# Patient Record
Sex: MALE | Race: WHITE | NOT HISPANIC OR LATINO | URBAN - METROPOLITAN AREA
[De-identification: names, ages, dates, MRNs, and addresses within clinical notes are randomized per-mention and may not be internally consistent; named-entity substitution may affect disease eponyms.]

---

## 2017-03-14 ENCOUNTER — HOSPITAL ENCOUNTER (OUTPATIENT)
Facility: MEDICAL CENTER | Age: 57
End: 2017-03-14
Attending: FAMILY MEDICINE
Payer: COMMERCIAL

## 2017-03-14 ENCOUNTER — OFFICE VISIT (OUTPATIENT)
Dept: INTERNAL MEDICINE | Facility: IMAGING CENTER | Age: 57
End: 2017-03-14
Payer: COMMERCIAL

## 2017-03-14 VITALS
RESPIRATION RATE: 12 BRPM | SYSTOLIC BLOOD PRESSURE: 130 MMHG | DIASTOLIC BLOOD PRESSURE: 82 MMHG | HEIGHT: 74 IN | TEMPERATURE: 97.7 F | WEIGHT: 208 LBS | OXYGEN SATURATION: 95 % | BODY MASS INDEX: 26.69 KG/M2 | HEART RATE: 86 BPM

## 2017-03-14 DIAGNOSIS — I10 ESSENTIAL HYPERTENSION: ICD-10-CM

## 2017-03-14 DIAGNOSIS — E78.5 DYSLIPIDEMIA: ICD-10-CM

## 2017-03-14 DIAGNOSIS — H83.3X3 NOISE-INDUCED HEARING LOSS OF BOTH EARS: Chronic | ICD-10-CM

## 2017-03-14 DIAGNOSIS — G89.29 OTHER CHRONIC BACK PAIN: ICD-10-CM

## 2017-03-14 DIAGNOSIS — E29.1 HYPOGONADISM MALE: ICD-10-CM

## 2017-03-14 DIAGNOSIS — M54.9 OTHER CHRONIC BACK PAIN: ICD-10-CM

## 2017-03-14 DIAGNOSIS — F41.8 SITUATIONAL ANXIETY: Chronic | ICD-10-CM

## 2017-03-14 DIAGNOSIS — Z23 NEED FOR TDAP VACCINATION: ICD-10-CM

## 2017-03-14 LAB
ALBUMIN SERPL BCP-MCNC: 4.6 G/DL (ref 3.2–4.9)
ALBUMIN/GLOB SERPL: 2.1 G/DL
ALP SERPL-CCNC: 48 U/L (ref 30–99)
ALT SERPL-CCNC: 30 U/L (ref 2–50)
ANION GAP SERPL CALC-SCNC: 6 MMOL/L (ref 0–11.9)
AST SERPL-CCNC: 25 U/L (ref 12–45)
BASOPHILS # BLD AUTO: 0.04 K/UL (ref 0–0.12)
BASOPHILS NFR BLD AUTO: 0.8 % (ref 0–1.8)
BILIRUB SERPL-MCNC: 0.5 MG/DL (ref 0.1–1.5)
BUN SERPL-MCNC: 17 MG/DL (ref 8–22)
CALCIUM SERPL-MCNC: 9.4 MG/DL (ref 8.5–10.5)
CHLORIDE SERPL-SCNC: 104 MMOL/L (ref 96–112)
CHOLEST SERPL-MCNC: 262 MG/DL (ref 100–199)
CO2 SERPL-SCNC: 28 MMOL/L (ref 20–33)
CREAT SERPL-MCNC: 0.91 MG/DL (ref 0.5–1.4)
EOSINOPHIL # BLD: 0.03 K/UL (ref 0–0.51)
EOSINOPHIL NFR BLD AUTO: 0.6 % (ref 0–6.9)
ERYTHROCYTE [DISTWIDTH] IN BLOOD BY AUTOMATED COUNT: 45.8 FL (ref 35.9–50)
EST. AVERAGE GLUCOSE BLD GHB EST-MCNC: 123 MG/DL
FOLATE SERPL-MCNC: >23.6 NG/ML
GLOBULIN SER CALC-MCNC: 2.2 G/DL (ref 1.9–3.5)
GLUCOSE SERPL-MCNC: 107 MG/DL (ref 65–99)
HBA1C MFR BLD: 5.9 % (ref 0–5.6)
HCT VFR BLD AUTO: 48.7 % (ref 42–52)
HDLC SERPL-MCNC: 50 MG/DL
HGB BLD-MCNC: 15.6 G/DL (ref 14–18)
IMM GRANULOCYTES # BLD AUTO: 0.02 K/UL (ref 0–0.11)
IMM GRANULOCYTES NFR BLD AUTO: 0.4 % (ref 0–0.9)
LDLC SERPL CALC-MCNC: 177 MG/DL
LYMPHOCYTES # BLD: 1.55 K/UL (ref 1–4.8)
LYMPHOCYTES NFR BLD AUTO: 31.7 % (ref 22–41)
MAGNESIUM SERPL-MCNC: 2 MG/DL (ref 1.5–2.5)
MCH RBC QN AUTO: 30.2 PG (ref 27–33)
MCHC RBC AUTO-ENTMCNC: 32 G/DL (ref 33.7–35.3)
MCV RBC AUTO: 94.4 FL (ref 81.4–97.8)
MONOCYTES # BLD: 0.34 K/UL (ref 0–0.85)
MONOCYTES NFR BLD AUTO: 7 % (ref 0–13.4)
NEUTROPHILS # BLD: 2.91 K/UL (ref 1.82–7.42)
NEUTROPHILS NFR BLD AUTO: 59.5 % (ref 44–72)
NRBC # BLD AUTO: 0 K/UL
NRBC BLD-RTO: 0 /100 WBC
PHOSPHATE SERPL-MCNC: 3.4 MG/DL (ref 2.5–4.5)
PLATELET # BLD AUTO: 196 K/UL (ref 164–446)
PMV BLD AUTO: 11.3 FL (ref 9–12.9)
POTASSIUM SERPL-SCNC: 4 MMOL/L (ref 3.6–5.5)
PROT SERPL-MCNC: 6.8 G/DL (ref 6–8.2)
PSA SERPL DL<=0.01 NG/ML-MCNC: 1.53 NG/ML (ref 0–4)
RBC # BLD AUTO: 5.16 M/UL (ref 4.7–6.1)
SODIUM SERPL-SCNC: 138 MMOL/L (ref 135–145)
TESTOST SERPL-MCNC: 290 NG/DL (ref 175–781)
TRIGL SERPL-MCNC: 175 MG/DL (ref 0–149)
TSH SERPL DL<=0.005 MIU/L-ACNC: 1.28 UIU/ML (ref 0.3–3.7)
VIT B12 SERPL-MCNC: 403 PG/ML (ref 211–911)
WBC # BLD AUTO: 4.9 K/UL (ref 4.8–10.8)

## 2017-03-14 PROCEDURE — 84403 ASSAY OF TOTAL TESTOSTERONE: CPT

## 2017-03-14 PROCEDURE — 80053 COMPREHEN METABOLIC PANEL: CPT

## 2017-03-14 PROCEDURE — 84443 ASSAY THYROID STIM HORMONE: CPT

## 2017-03-14 PROCEDURE — 84153 ASSAY OF PSA TOTAL: CPT

## 2017-03-14 PROCEDURE — 82607 VITAMIN B-12: CPT

## 2017-03-14 PROCEDURE — 90715 TDAP VACCINE 7 YRS/> IM: CPT | Performed by: FAMILY MEDICINE

## 2017-03-14 PROCEDURE — 80061 LIPID PANEL: CPT

## 2017-03-14 PROCEDURE — 82746 ASSAY OF FOLIC ACID SERUM: CPT

## 2017-03-14 PROCEDURE — 85025 COMPLETE CBC W/AUTO DIFF WBC: CPT

## 2017-03-14 PROCEDURE — 99204 OFFICE O/P NEW MOD 45 MIN: CPT | Mod: 25 | Performed by: FAMILY MEDICINE

## 2017-03-14 PROCEDURE — 84100 ASSAY OF PHOSPHORUS: CPT

## 2017-03-14 PROCEDURE — 83036 HEMOGLOBIN GLYCOSYLATED A1C: CPT

## 2017-03-14 PROCEDURE — 83735 ASSAY OF MAGNESIUM: CPT

## 2017-03-14 PROCEDURE — 90471 IMMUNIZATION ADMIN: CPT | Performed by: FAMILY MEDICINE

## 2017-03-14 RX ORDER — ASCORBIC ACID 500 MG
500 TABLET ORAL DAILY
COMMUNITY
End: 2023-12-13

## 2017-03-14 RX ORDER — CALCIUM CARBONATE 500(1250)
500 TABLET ORAL 2 TIMES DAILY WITH MEALS
COMMUNITY
End: 2021-02-19

## 2017-03-14 NOTE — MR AVS SNAPSHOT
"        Reggie Correia   3/14/2017 10:00 AM   Office Visit   MRN: [id number]    Department:  [facility]   Dept Phone:  [phone]    Description:  Male : 1960   Provider:  Deyanira Robert M.D.           Allergies as of 3/14/2017     No Known Allergies      You were diagnosed with     Hypogonadism male   [783500]       Essential hypertension   [5897131]       Dyslipidemia   [130034]       Other chronic back pain   [8853399]       Need for Tdap vaccination   [113200]       Situational anxiety   [240117]       Noise-induced hearing loss of both ears   [6117737]         Vital Signs     Blood Pressure Pulse Temperature Respirations Height Weight    130/82 mmHg 86 36.5 °C (97.7 °F) 12 1.88 m (6' 2\") 94.348 kg (208 lb)    Body Mass Index Oxygen Saturation Smoking Status             26.69 kg/m2 95% Never Smoker          Basic Information     Date Of Birth Sex Race Ethnicity Preferred Language    1960 Male White Non- English      Problem List              ICD-10-CM Priority Class Noted - Resolved    Dyslipidemia E78.5   2/3/2011 - Present    Chronic back pain M54.9, G89.29   2/3/2011 - Present    Hypogonadism male E29.1   2012 - Present    HTN (hypertension) I10   2012 - Present    Situational anxiety (Chronic) F41.8   3/14/2017 - Present    Noise-induced hearing loss of both ears (Chronic) H83.3X3   3/14/2017 - Present      Health Maintenance        Date Due Completion Dates    IMM DTaP/Tdap/Td Vaccine (1 - Tdap) 1979 ---    IMM INFLUENZA (1) 10/2/2017 (Originally 2016) ---    COLONOSCOPY 2021 (Done)    Override on 2011: Done            Current Immunizations     Tdap Vaccine 3/14/2017      Below and/or attached are the medications your provider expects you to take. Review all of your home medications and newly ordered medications with your provider and/or pharmacist. Follow medication instructions as directed by your provider and/or pharmacist. " Please keep your medication list with you and share with your provider. Update the information when medications are discontinued, doses are changed, or new medications (including over-the-counter products) are added; and carry medication information at all times in the event of emergency situations     Allergies:  No Known Allergies          Medications  Valid as of: March 14, 2017 -  1:40 PM    Generic Name Brand Name Tablet Size Instructions for use    ALPRAZolam (Tab) XANAX 0.5 MG Take 1 Tab by mouth. Patient to take 1 tab PO q 8 hours prn anxiety or sleep        Ascorbic Acid (Tab) ascorbic acid 500 MG Take 500 mg by mouth every day.        Azelastine HCl (Solution) OPTIVAR 0.05 % Place 1 Drop in right eye 2 times a day. Use in affected eye(s)        Multiple Vitamin (Tab) THERAGRAN  Take 1 Tab by mouth every day.        Oyster Shell (Tab) OS-KALEB 500 500 MG Take 500 mg by mouth 2 times a day, with meals.        .                 Medicines prescribed today were sent to:     [id number] - [place], [place] - [address]    [address] [place] [address]    Phone: [phone] Fax: [phone]    Open 24 Hours?: No      Medication refill instructions:       If your prescription bottle indicates you have medication refills left, it is not necessary to call your provider’s office. Please contact your pharmacy and they will refill your medication.    If your prescription bottle indicates you do not have any refills left, you may request refills at any time through one of the following ways: The online [employer] system (except Urgent Care), by calling your provider’s office, or by asking your pharmacy to contact your provider’s office with a refill request. Medication refills are processed only during regular business hours and may not be available until the next business day. Your provider may request additional information or to have a follow-up visit with you prior to refilling your medication.   *Please  Note: Medication refills are assigned a new Rx number when refilled electronically. Your pharmacy may indicate that no refills were authorized even though a new prescription for the same medication is available at the pharmacy. Please request the medicine by name with the pharmacy before contacting your provider for a refill.        Your To Do List     Future Labs/Procedures Complete By Expires    CBC WITH DIFFERENTIAL  As directed 3/14/2018    COMP METABOLIC PANEL  As directed 3/14/2018    FOLATE  As directed 3/14/2018    HEMOGLOBIN A1C  As directed 3/14/2018    LIPID PROFILE  As directed 3/14/2018    MAGNESIUM  As directed 3/14/2018    PHOSPHORUS  As directed 3/14/2018    PROSTATE SPECIFIC AG SCREENING  As directed 3/14/2018    TESTOSTERONE SERUM  As directed 3/14/2018    TSH WITH REFLEX TO FT4  As directed 3/14/2018    VITAMIN B12  As directed 3/14/2018         MyChart Access Code: Activation code not generated  Current MyChart Status: Active          Quit Tobacco Information     Do you want to quit using tobacco?    Quitting tobacco decreases risks of cancer, heart and lung disease, increases life expectancy, improves sense of taste and smell, and increases spending money, among other benefits.    If you are thinking about quitting, we can help.  • [facility] Quit Tobacco Program: [phone]  o Program occurs weekly for four weeks and includes pharmacist consultation on products to support quitting smoking or chewing tobacco. A provider referral is needed for pharmacist consultation.  • Tobacco Users Help Hotline: 0-474-QUIT-NOW (839-2407) or https://[place].quitlogix.org/  o Free, confidential telephone and online coaching for [place] residents. Sessions are designed on a schedule that is convenient for you. Eligible clients receive free nicotine replacement therapy.  • Nationally: www.smokefree.gov  o Information and professional assistance to support both immediate and long-term needs as you become, and remain, a  non-smoker. Smokefree.gov allows you to choose the help that best fits your needs.

## 2017-03-14 NOTE — PROGRESS NOTES
"CC: Establish care    HPI:  Patient is a 56 y.o. male who last saw PCP back in 2012 for chronic medical issues, and he and his wife are here to establish care today. He reports multiple chronic issues but has truly not had any kind of follow-up on them. He has stopped multiple prescribed medications over the years on his own to due perception of ineffectiveness. He does report having a clean colonoscopy with [facility] in the past and overall feels fairly well. He has not current complaints and would like baseline labs drawn today. He reports prior work-up for hiatal hernia and BPH by GI and Urology - both negative.    Patient Active Problem List    Diagnosis Date Noted   • Situational anxiety 03/14/2017   • Hypogonadism male 01/05/2012   • HTN (hypertension) 01/05/2012   • Dyslipidemia 02/03/2011   • Chronic back pain 02/03/2011     Past medical, surgical, family, and social history was reviewed and updated in Epic chart by me today.     Medications and allergies reviewed and updated in Epic chart by me today.     ROS:  Pertinent positives listed above in HPI. All other systems have been reviewed and are negative.    PE:   /82 mmHg  Pulse 86  Temp(Src) 36.5 °C (97.7 °F)  Resp 12  Ht 1.88 m (6' 2\")  Wt 94.348 kg (208 lb)  BMI 26.69 kg/m2  SpO2 95%  Vital signs reviewed with patient.     Gen: Well developed; well nourished; no acute distress; age appropriate appearance   HEENT: Normocephalic; atraumatic; PEERLA b/l; sclera clear b/l; b/l external auditory canals WNL; b/l TM WNL; oropharynx clear; oral mucosa moist; tongue midline; dentition poor; no oral lesions noted  Neck: No adenopathy; no thyromegaly  CV: Regular rate and rhythm; S1/ S2 present; no murmur, gallop or rub noted  Pulm: No respiratory distress; clear to ascultation b/l; no wheezing or stridor noted b/l  Abd: Adequate bowel sounds noted; soft and nontender; no rebound, rigidity, nor distention  Extremities: No peripheral edema b/l LE " extremities/ no clubbing nor cyanosis noted  Skin: Warm and dry; no rashes noted   Neuro: No focal deficits noted   Psych: AAOx4; mood and affect are appropriate  Rectal deferred due to PSA testing     A/P:  1. Chronic hypogonadism male  Previously on testosterone replacement - stopped due to cost issues/ will recheck level today  - TESTOSTERONE SERUM; Future    2. Chronic essential hypertension  No current treatment per patient report/ will follow and check labs today  - CBC WITH DIFFERENTIAL; Future  - COMP METABOLIC PANEL; Future  - HEMOGLOBIN A1C; Future  - PROSTATE SPECIFIC AG SCREENING; Future  - VITAMIN B12; Future  - FOLATE; Future  - MAGNESIUM; Future  - PHOSPHORUS; Future  - TSH WITH REFLEX TO FT4; Future    3. Chronic dyslipidemia  Noncompliant with crestor/ fasting labs today   - LIPID PROFILE; Future    4. Other chronic back pain  Stable/ no current treatment plan    5. Need for Tdap vaccination  Vaccine administered today  - Tdap =>8yo IM    6. Chronic situational anxiety/ positional   Stable/ cont xanax as needed    I will follow-up directly with patient regarding his lab results and subsequent treatment plans.

## 2017-03-17 DIAGNOSIS — E78.5 DYSLIPIDEMIA: ICD-10-CM

## 2017-03-17 RX ORDER — ATORVASTATIN CALCIUM 40 MG/1
40 TABLET, FILM COATED ORAL
Qty: 30 TAB | Refills: 6 | Status: SHIPPED | OUTPATIENT
Start: 2017-03-17 | End: 2017-12-28 | Stop reason: SDUPTHER

## 2017-04-28 DIAGNOSIS — N52.9 ERECTILE DYSFUNCTION, UNSPECIFIED ERECTILE DYSFUNCTION TYPE: Chronic | ICD-10-CM

## 2017-04-28 RX ORDER — TADALAFIL 10 MG/1
TABLET ORAL
Qty: 10 TAB | Refills: 3 | Status: SHIPPED | OUTPATIENT
Start: 2017-04-28 | End: 2018-02-16

## 2017-12-28 RX ORDER — ATORVASTATIN CALCIUM 40 MG/1
TABLET, FILM COATED ORAL
Qty: 30 TAB | Refills: 0 | Status: SHIPPED | OUTPATIENT
Start: 2017-12-28 | End: 2018-02-16 | Stop reason: SDUPTHER

## 2018-02-16 ENCOUNTER — OFFICE VISIT (OUTPATIENT)
Dept: MEDICAL GROUP | Facility: MEDICAL CENTER | Age: 58
End: 2018-02-16
Payer: COMMERCIAL

## 2018-02-16 VITALS
BODY MASS INDEX: 27.16 KG/M2 | TEMPERATURE: 99.4 F | HEART RATE: 90 BPM | OXYGEN SATURATION: 95 % | HEIGHT: 74 IN | SYSTOLIC BLOOD PRESSURE: 118 MMHG | WEIGHT: 211.64 LBS | DIASTOLIC BLOOD PRESSURE: 82 MMHG | RESPIRATION RATE: 20 BRPM

## 2018-02-16 DIAGNOSIS — N52.9 ERECTILE DYSFUNCTION, UNSPECIFIED ERECTILE DYSFUNCTION TYPE: Chronic | ICD-10-CM

## 2018-02-16 DIAGNOSIS — Z71.3 ENCOUNTER FOR WEIGHT LOSS COUNSELING: ICD-10-CM

## 2018-02-16 DIAGNOSIS — Z00.00 ANNUAL PHYSICAL EXAM: Primary | ICD-10-CM

## 2018-02-16 PROBLEM — F41.8 SITUATIONAL ANXIETY: Chronic | Status: RESOLVED | Noted: 2017-03-14 | Resolved: 2018-02-16

## 2018-02-16 PROCEDURE — 99396 PREV VISIT EST AGE 40-64: CPT | Performed by: FAMILY MEDICINE

## 2018-02-16 RX ORDER — ATORVASTATIN CALCIUM 40 MG/1
TABLET, FILM COATED ORAL
Qty: 90 TAB | Refills: 3 | Status: SHIPPED | OUTPATIENT
Start: 2018-02-16 | End: 2019-03-10 | Stop reason: SDUPTHER

## 2018-02-16 ASSESSMENT — PATIENT HEALTH QUESTIONNAIRE - PHQ9: CLINICAL INTERPRETATION OF PHQ2 SCORE: 0

## 2018-02-16 NOTE — PROGRESS NOTES
This medical record contains text that has been entered with the assistance of computer voice recognition and dictation software.  Therefore, it may contain unintended errors in text, spelling, punctuation, or grammar        Chief Complaint   Patient presents with   • Establish Care       Reggie Correia is a 57 y.o. male here evaluation and management of: annual physical est care      HPI:       daughter is senior in  and excellent horseback rider  wife is Leisa   and he has son in [place]       Feels well in his usual state of health   He lost 20 lbs with whole 30 diet!  He has h/o high a1c   He has h/o high cholesterol         He would like to do labs            Current Outpatient Prescriptions   Medication Sig Dispense Refill   • atorvastatin (LIPITOR) 40 MG Tab TAKE ONE TABLET BY MOUTH AT BEDTIME 90 Tab 3   • multivitamin (THERAGRAN) Tab Take 1 Tab by mouth every day.     • ascorbic acid (ASCORBIC ACID) 500 MG Tab Take 500 mg by mouth every day.     • calcium carbonate (CALCIUM CARBONATE) 500 MG Tab Take 500 mg by mouth 2 times a day, with meals.     • azelastine (OPTIVAR) 0.05 % ophthalmic solution Place 1 Drop in right eye 2 times a day. Use in affected eye(s) 1 Bottle 3     No current facility-administered medications for this visit.      Patient Active Problem List    Diagnosis Date Noted   • Encounter for weight loss counseling 02/16/2018   • Erectile dysfunction 04/28/2017   • Noise-induced hearing loss of both ears 03/14/2017   • Hypogonadism male 01/05/2012   • Dyslipidemia 02/03/2011   • Chronic back pain 02/03/2011     Past Surgical History:   Procedure Laterality Date   • SHOULDER ARTHROSCOPY W/ ROTATOR CUFF REPAIR  7/21/2009    Performed by XENIA SHER at SURGERY [facility]   • SHOULDER DECOMPRESSION ARTHROSCOPIC  7/21/2009    Performed by [facility] at [facility]   • CLAVICLE DISTAL EXCISION  7/21/2009    Performed by [facility] SHER at [facility]  "  • EYE SURGERY      lasik   • KNEE ARTHROSCOPY      bilateral   • TONSILLECTOMY        Social History   Substance Use Topics   • Smoking status: Never Smoker   • Smokeless tobacco: Current User     Types: Chew   • Alcohol use No      Comment: rare     Family History   Problem Relation Age of Onset   • Cancer Mother 50     breast cancer   • Psychiatry Father 54     suicide   • Psychiatry Brother 48     suicide           ROS  No fatigue   Greater than 10 point review of system completed and negative except for those listed in HPI     Objective:     Blood pressure 118/82, pulse 90, temperature 37.4 °C (99.4 °F), resp. rate 20, height 1.88 m (6' 2\"), weight 96 kg (211 lb 10.3 oz), SpO2 95 %. Body mass index is 27.17 kg/m².  Physical Exam:    Constitutional: Alert, no distress.  Skin: Warm, dry, good turgor, no rashes in visible areas.  Eye: Equal, round and reactive, conjunctiva clear, lids normal.  ENMT: Lips without lesions, good dentition, oropharynx clear.  Neck: Trachea midline, no masses, no thyromegaly. No cervical or supraclavicular lymphadenopathy.  Respiratory: Unlabored respiratory effort, lungs clear to auscultation, no wheezes, no ronchi.  Cardiovascular: Normal S1, S2, no murmur, no edema.  Abdomen: Soft, non-tender, no masses, no hepatosplenomegaly.  Psych: Alert and oriented x3, normal affect and mood.          Assessment and Plan:   The following treatment plan was discussed        Problem List Items Addressed This Visit     Erectile dysfunction (Chronic)    Encounter for weight loss counseling      Other Visit Diagnoses     Annual physical exam    -  Primary    Relevant Medications    atorvastatin (LIPITOR) 40 MG Tab    Other Relevant Orders    LDL, DIRECT    HDL CHOLESTEROL    CHOLESTEROL    HEMOGLOBIN A1C    PROSTATE SPECIFIC AG    BASIC METABOLIC PANEL                Instructed to follow up if symptoms worsen or fail to improve, ER/UC precautions discussed as well    Olga Lidia Gregorio MD  [facility], Family Medicine   [address] [place] [address] [address]kw[address]   [place] [address]  Phone: [phone]

## 2018-03-01 ENCOUNTER — HOSPITAL ENCOUNTER (OUTPATIENT)
Dept: LAB | Facility: MEDICAL CENTER | Age: 58
End: 2018-03-01
Attending: FAMILY MEDICINE
Payer: COMMERCIAL

## 2018-03-01 DIAGNOSIS — Z00.00 ANNUAL PHYSICAL EXAM: ICD-10-CM

## 2018-03-01 LAB
ANION GAP SERPL CALC-SCNC: 7 MMOL/L (ref 0–11.9)
BUN SERPL-MCNC: 17 MG/DL (ref 8–22)
CALCIUM SERPL-MCNC: 9.5 MG/DL (ref 8.5–10.5)
CHLORIDE SERPL-SCNC: 105 MMOL/L (ref 96–112)
CHOLEST SERPL-MCNC: 196 MG/DL (ref 100–199)
CO2 SERPL-SCNC: 29 MMOL/L (ref 20–33)
CREAT SERPL-MCNC: 0.84 MG/DL (ref 0.5–1.4)
EST. AVERAGE GLUCOSE BLD GHB EST-MCNC: 123 MG/DL
GLUCOSE SERPL-MCNC: 107 MG/DL (ref 65–99)
HBA1C MFR BLD: 5.9 % (ref 0–5.6)
HDLC SERPL-MCNC: 47 MG/DL
POTASSIUM SERPL-SCNC: 4.3 MMOL/L (ref 3.6–5.5)
PSA SERPL-MCNC: 2.02 NG/ML (ref 0–4)
SODIUM SERPL-SCNC: 141 MMOL/L (ref 135–145)

## 2018-03-01 PROCEDURE — 83721 ASSAY OF BLOOD LIPOPROTEIN: CPT

## 2018-03-01 PROCEDURE — 84153 ASSAY OF PSA TOTAL: CPT

## 2018-03-01 PROCEDURE — 80048 BASIC METABOLIC PNL TOTAL CA: CPT

## 2018-03-01 PROCEDURE — 83036 HEMOGLOBIN GLYCOSYLATED A1C: CPT

## 2018-03-01 PROCEDURE — 82465 ASSAY BLD/SERUM CHOLESTEROL: CPT

## 2018-03-01 PROCEDURE — 36415 COLL VENOUS BLD VENIPUNCTURE: CPT

## 2018-03-01 PROCEDURE — 83718 ASSAY OF LIPOPROTEIN: CPT

## 2018-03-02 LAB — LDLC SERPL-MCNC: 130 MG/DL (ref 0–129)

## 2018-12-14 ENCOUNTER — APPOINTMENT (RX ONLY)
Dept: URBAN - METROPOLITAN AREA CLINIC 35 | Facility: CLINIC | Age: 58
Setting detail: DERMATOLOGY
End: 2018-12-14

## 2018-12-14 DIAGNOSIS — L82.1 OTHER SEBORRHEIC KERATOSIS: ICD-10-CM

## 2018-12-14 DIAGNOSIS — L81.4 OTHER MELANIN HYPERPIGMENTATION: ICD-10-CM

## 2018-12-14 DIAGNOSIS — Z71.89 OTHER SPECIFIED COUNSELING: ICD-10-CM

## 2018-12-14 DIAGNOSIS — L90.5 SCAR CONDITIONS AND FIBROSIS OF SKIN: ICD-10-CM

## 2018-12-14 DIAGNOSIS — D22 MELANOCYTIC NEVI: ICD-10-CM

## 2018-12-14 DIAGNOSIS — L72.0 EPIDERMAL CYST: ICD-10-CM

## 2018-12-14 DIAGNOSIS — I78.8 OTHER DISEASES OF CAPILLARIES: ICD-10-CM

## 2018-12-14 PROBLEM — L57.0 ACTINIC KERATOSIS: Status: ACTIVE | Noted: 2018-12-14

## 2018-12-14 PROBLEM — E78.5 HYPERLIPIDEMIA, UNSPECIFIED: Status: ACTIVE | Noted: 2018-12-14

## 2018-12-14 PROBLEM — D22.5 MELANOCYTIC NEVI OF TRUNK: Status: ACTIVE | Noted: 2018-12-14

## 2018-12-14 PROBLEM — I10 ESSENTIAL (PRIMARY) HYPERTENSION: Status: ACTIVE | Noted: 2018-12-14

## 2018-12-14 PROCEDURE — ? COUNSELING

## 2018-12-14 PROCEDURE — 99213 OFFICE O/P EST LOW 20 MIN: CPT

## 2018-12-14 ASSESSMENT — LOCATION DETAILED DESCRIPTION DERM
LOCATION DETAILED: RIGHT SUPERIOR MEDIAL UPPER BACK
LOCATION DETAILED: RIGHT INFERIOR MEDIAL UPPER BACK
LOCATION DETAILED: LEFT INFERIOR UPPER BACK
LOCATION DETAILED: RIGHT MEDIAL UPPER BACK
LOCATION DETAILED: LEFT INFERIOR TEMPLE
LOCATION DETAILED: RIGHT SUPERIOR HELIX

## 2018-12-14 ASSESSMENT — LOCATION SIMPLE DESCRIPTION DERM
LOCATION SIMPLE: LEFT TEMPLE
LOCATION SIMPLE: LEFT UPPER BACK
LOCATION SIMPLE: RIGHT UPPER BACK
LOCATION SIMPLE: RIGHT EAR

## 2018-12-14 ASSESSMENT — LOCATION ZONE DERM
LOCATION ZONE: EAR
LOCATION ZONE: TRUNK
LOCATION ZONE: FACE

## 2019-07-22 DIAGNOSIS — Z00.00 ANNUAL PHYSICAL EXAM: ICD-10-CM

## 2019-07-22 RX ORDER — ATORVASTATIN CALCIUM 40 MG/1
TABLET, FILM COATED ORAL
Qty: 90 TAB | Refills: 0 | Status: SHIPPED | OUTPATIENT
Start: 2019-07-22 | End: 2020-01-15 | Stop reason: SDUPTHER

## 2020-01-15 ENCOUNTER — OFFICE VISIT (OUTPATIENT)
Dept: MEDICAL GROUP | Facility: MEDICAL CENTER | Age: 60
End: 2020-01-15
Payer: COMMERCIAL

## 2020-01-15 VITALS
SYSTOLIC BLOOD PRESSURE: 132 MMHG | HEIGHT: 73 IN | RESPIRATION RATE: 14 BRPM | TEMPERATURE: 98.9 F | HEART RATE: 92 BPM | BODY MASS INDEX: 29.29 KG/M2 | OXYGEN SATURATION: 98 % | DIASTOLIC BLOOD PRESSURE: 82 MMHG | WEIGHT: 221 LBS

## 2020-01-15 DIAGNOSIS — G89.29 CHRONIC LOW BACK PAIN, UNSPECIFIED BACK PAIN LATERALITY, UNSPECIFIED WHETHER SCIATICA PRESENT: ICD-10-CM

## 2020-01-15 DIAGNOSIS — Z12.11 SCREENING FOR COLON CANCER: ICD-10-CM

## 2020-01-15 DIAGNOSIS — M54.50 CHRONIC LOW BACK PAIN, UNSPECIFIED BACK PAIN LATERALITY, UNSPECIFIED WHETHER SCIATICA PRESENT: ICD-10-CM

## 2020-01-15 DIAGNOSIS — E29.1 HYPOGONADISM MALE: ICD-10-CM

## 2020-01-15 DIAGNOSIS — E78.5 DYSLIPIDEMIA (HIGH LDL; LOW HDL): ICD-10-CM

## 2020-01-15 DIAGNOSIS — Z00.00 ANNUAL PHYSICAL EXAM: ICD-10-CM

## 2020-01-15 DIAGNOSIS — Z12.5 SCREENING FOR PROSTATE CANCER: ICD-10-CM

## 2020-01-15 DIAGNOSIS — R79.89 LOW TESTOSTERONE: ICD-10-CM

## 2020-01-15 DIAGNOSIS — R53.83 OTHER FATIGUE: ICD-10-CM

## 2020-01-15 DIAGNOSIS — Z11.59 NEED FOR HEPATITIS C SCREENING TEST: ICD-10-CM

## 2020-01-15 PROCEDURE — 99396 PREV VISIT EST AGE 40-64: CPT | Performed by: FAMILY MEDICINE

## 2020-01-15 PROCEDURE — 99213 OFFICE O/P EST LOW 20 MIN: CPT | Mod: 25 | Performed by: FAMILY MEDICINE

## 2020-01-15 RX ORDER — ATORVASTATIN CALCIUM 40 MG/1
TABLET, FILM COATED ORAL
Qty: 90 TAB | Refills: 3 | Status: SHIPPED | OUTPATIENT
Start: 2020-01-15 | End: 2021-04-27 | Stop reason: SDUPTHER

## 2020-01-15 RX ORDER — CYCLOBENZAPRINE HCL 10 MG
10 TABLET ORAL 3 TIMES DAILY PRN
Qty: 30 TAB | Refills: 0 | Status: SHIPPED
Start: 2020-01-15 | End: 2020-01-31

## 2020-01-16 NOTE — ASSESSMENT & PLAN NOTE
New to me issues   Non radiating   Caused after a lot of house work   This is a chronic condition will flair up from time to time  No red flags  He is asking for xanax   I do not feel comfortable rxing benzo for this condition   Will try muscle relax but ultimately I recommend PT and short term follow up

## 2020-01-16 NOTE — PROGRESS NOTES
This medical record contains text that has been entered with the assistance of computer voice recognition and dictation software.  Therefore, it may contain unintended errors in text, spelling, punctuation, or grammar        Chief Complaint   Patient presents with   • Annual Exam     annual exam for medications        Reggie Correia is a 59 y.o. male here evaluation and management of: annual physical dyslipidemia and refill statin     HPI:       Daughter is sophomore science major at [employer]  wife is Leisa   Son in HS      Feels well in his usual state of health   He lost 20 lbs with whole 30 diet! But had regain this past two years   He has h/o high a1c   He has h/o high cholesterol         He would like to do labs                Current Outpatient Medications   Medication Sig Dispense Refill   • atorvastatin (LIPITOR) 40 MG Tab TAKE ONE TABLET BY MOUTH AT BEDTIME 90 Tab 3   • cyclobenzaprine (FLEXERIL) 10 MG Tab Take 1 Tab by mouth 3 times a day as needed. 30 Tab 0   • multivitamin (THERAGRAN) Tab Take 1 Tab by mouth every day.     • ascorbic acid (ASCORBIC ACID) 500 MG Tab Take 500 mg by mouth every day.     • calcium carbonate (CALCIUM CARBONATE) 500 MG Tab Take 500 mg by mouth 2 times a day, with meals.     • azelastine (OPTIVAR) 0.05 % ophthalmic solution Place 1 Drop in right eye 2 times a day. Use in affected eye(s) 1 Bottle 3     No current facility-administered medications for this visit.      Patient Active Problem List    Diagnosis Date Noted   • Annual physical exam 01/15/2020   • Low testosterone 01/15/2020   • Encounter for weight loss counseling 02/16/2018   • Erectile dysfunction 04/28/2017   • Noise-induced hearing loss of both ears 03/14/2017   • Hypogonadism male 01/05/2012   • Dyslipidemia 02/03/2011   • Chronic back pain 02/03/2011     Past Surgical History:   Procedure Laterality Date   • SHOULDER ARTHROSCOPY W/ ROTATOR CUFF REPAIR  7/21/2009    Performed by XENIA [facility] at [facility]  [facility]   • SHOULDER DECOMPRESSION ARTHROSCOPIC  7/21/2009    Performed by XENIA SHER at SURGERY [facility]   • CLAVICLE DISTAL EXCISION  7/21/2009    Performed by XENIA SHER at SURGERY [facility]   • EYE SURGERY      lasik   • KNEE ARTHROSCOPY      bilateral   • TONSILLECTOMY        Social History     Tobacco Use   • Smoking status: Never Smoker   • Smokeless tobacco: Current User     Types: Chew   Substance Use Topics   • Alcohol use: No     Comment: rare   • Drug use: No     Family History   Problem Relation Age of Onset   • Cancer Mother 50        breast cancer   • Psychiatric Illness Father 54        suicide   • Psychiatric Illness Brother 48        suicide           ROS  No fatigue   Greater than 10 point review of system completed and negative except for those listed in HPI - and a/p   Mentions low back pain at end of encounter and fatigue   No fever  + snoring      Objective:     /82 (BP Location: Left arm, Patient Position: Sitting, BP Cuff Size: Adult long)   Pulse 92   Temp 37.2 °C (98.9 °F) (Temporal)   Resp 14   Ht 1.854 m (6' 1\")   Wt 100.2 kg (221 lb)   SpO2 98%  Body mass index is 29.16 kg/m².  Physical Exam:    Constitutional: Alert, no distress.  Skin: Warm, dry, good turgor, no rashes in visible areas.  Eye: Equal, round and reactive, conjunctiva clear, lids normal.  ENMT: Lips without lesions, good dentition, oropharynx clear.  Neck: Trachea midline, no masses, no thyromegaly. No cervical or supraclavicular lymphadenopathy.  Respiratory: Unlabored respiratory effort, lungs clear to auscultation, no wheezes, no ronchi.  Cardiovascular: Normal S1, S2, no murmur, no edema.  Abdomen: Soft, non-tender, no masses, no hepatosplenomegaly.  Psych: Alert and oriented x3, normal affect and mood.      Back no midline ttp   Gait steady and nl       Assessment and Plan:   The following treatment plan was discussed        Problem List Items Addressed This Visit     Chronic back " pain     New to me issues   Non radiating   Caused after a lot of house work   This is a chronic condition will flair up from time to time  No red flags  He is asking for xanax   I do not feel comfortable rxing benzo for this condition   Will try muscle relax but ultimately I recommend PT and short term follow up          Relevant Medications    cyclobenzaprine (FLEXERIL) 10 MG Tab    Other Relevant Orders    REFERRAL TO PHYSICAL THERAPY Reason for Therapy: Eval/Treat/Report    Hypogonadism male     New to me issue   Apparently used to do steroid injections  I review labs  I review common causes and we ultimately decide to screen for sleep apnea    As well as follow up to review results             Annual physical exam     [facility] discussed   Labs and colon and prostate cancer screening ordered    Vaccines discussed  He already did flu          Relevant Medications    atorvastatin (LIPITOR) 40 MG Tab    Other Relevant Orders    Basic Metabolic Panel    Lipid Profile    Low testosterone    Relevant Orders    REFERRAL TO SLEEP STUDIES      Other Visit Diagnoses     Dyslipidemia (high LDL; low HDL)        Relevant Medications    atorvastatin (LIPITOR) 40 MG Tab    Other Relevant Orders    Basic Metabolic Panel    Lipid Profile    Screening for prostate cancer        Relevant Orders    PROSTATE SPECIFIC AG SCREENING    Screening for colon cancer        Relevant Orders    REFERRAL TO GI FOR COLONOSCOPY    Need for hepatitis C screening test        Relevant Orders    HEP C VIRUS ANTIBODY    Other fatigue        Relevant Orders    TESTOSTERONE SERUM                Instructed to follow up if symptoms worsen or fail to improve, ER/UC precautions discussed as well    Olga Lidia Gregorio MD  [facility], Family Medicine   [address] [place] [address] [address]   [place] [address]  Phone: [phone]

## 2020-01-16 NOTE — ASSESSMENT & PLAN NOTE
Prev health care discussed   Labs and colon and prostate cancer screening ordered    Vaccines discussed  He already did flu

## 2020-01-16 NOTE — ASSESSMENT & PLAN NOTE
New to me issue   Apparently used to do steroid injections  I review labs  I review common causes and we ultimately decide to screen for sleep apnea    As well as follow up to review results

## 2020-01-23 ENCOUNTER — HOSPITAL ENCOUNTER (OUTPATIENT)
Dept: LAB | Facility: MEDICAL CENTER | Age: 60
End: 2020-01-23
Attending: FAMILY MEDICINE
Payer: COMMERCIAL

## 2020-01-23 DIAGNOSIS — Z00.00 ANNUAL PHYSICAL EXAM: ICD-10-CM

## 2020-01-23 DIAGNOSIS — E78.5 DYSLIPIDEMIA (HIGH LDL; LOW HDL): ICD-10-CM

## 2020-01-23 DIAGNOSIS — Z11.59 NEED FOR HEPATITIS C SCREENING TEST: ICD-10-CM

## 2020-01-23 DIAGNOSIS — Z12.5 SCREENING FOR PROSTATE CANCER: ICD-10-CM

## 2020-01-23 DIAGNOSIS — R53.83 OTHER FATIGUE: ICD-10-CM

## 2020-01-23 LAB
ANION GAP SERPL CALC-SCNC: 8 MMOL/L (ref 0–11.9)
BUN SERPL-MCNC: 16 MG/DL (ref 8–22)
CALCIUM SERPL-MCNC: 9.9 MG/DL (ref 8.5–10.5)
CHLORIDE SERPL-SCNC: 104 MMOL/L (ref 96–112)
CHOLEST SERPL-MCNC: 256 MG/DL (ref 100–199)
CO2 SERPL-SCNC: 28 MMOL/L (ref 20–33)
CREAT SERPL-MCNC: 0.88 MG/DL (ref 0.5–1.4)
FASTING STATUS PATIENT QL REPORTED: NORMAL
GLUCOSE SERPL-MCNC: 113 MG/DL (ref 65–99)
HCV AB SER QL: NEGATIVE
HDLC SERPL-MCNC: 33 MG/DL
LDLC SERPL CALC-MCNC: 169 MG/DL
POTASSIUM SERPL-SCNC: 4.4 MMOL/L (ref 3.6–5.5)
PSA SERPL-MCNC: 2.19 NG/ML (ref 0–4)
SODIUM SERPL-SCNC: 140 MMOL/L (ref 135–145)
TESTOST SERPL-MCNC: 320 NG/DL (ref 175–781)
TRIGL SERPL-MCNC: 270 MG/DL (ref 0–149)

## 2020-01-23 PROCEDURE — 80048 BASIC METABOLIC PNL TOTAL CA: CPT

## 2020-01-23 PROCEDURE — 80061 LIPID PANEL: CPT

## 2020-01-23 PROCEDURE — 86803 HEPATITIS C AB TEST: CPT

## 2020-01-23 PROCEDURE — 84153 ASSAY OF PSA TOTAL: CPT

## 2020-01-23 PROCEDURE — 36415 COLL VENOUS BLD VENIPUNCTURE: CPT

## 2020-01-23 PROCEDURE — 84403 ASSAY OF TOTAL TESTOSTERONE: CPT

## 2020-01-31 ENCOUNTER — OFFICE VISIT (OUTPATIENT)
Dept: MEDICAL GROUP | Facility: IMAGING CENTER | Age: 60
End: 2020-01-31
Payer: COMMERCIAL

## 2020-01-31 VITALS
BODY MASS INDEX: 29.24 KG/M2 | DIASTOLIC BLOOD PRESSURE: 80 MMHG | WEIGHT: 220.6 LBS | HEART RATE: 86 BPM | OXYGEN SATURATION: 96 % | HEIGHT: 73 IN | TEMPERATURE: 98.7 F | RESPIRATION RATE: 14 BRPM | SYSTOLIC BLOOD PRESSURE: 124 MMHG

## 2020-01-31 DIAGNOSIS — E78.5 DYSLIPIDEMIA: ICD-10-CM

## 2020-01-31 DIAGNOSIS — H93.A9 PULSATILE TINNITUS: ICD-10-CM

## 2020-01-31 DIAGNOSIS — N52.9 ERECTILE DYSFUNCTION, UNSPECIFIED ERECTILE DYSFUNCTION TYPE: Chronic | ICD-10-CM

## 2020-01-31 PROCEDURE — 99214 OFFICE O/P EST MOD 30 MIN: CPT | Performed by: FAMILY MEDICINE

## 2020-01-31 NOTE — ASSESSMENT & PLAN NOTE
I am not familiar with the possible of fenugreek side effects patient is aware this he takes this at his own risk  I recommend increasing physical activity and adjusting diet to maintain a BMI 25 or less to reduce cholesterol and fasting glucose levels as well as blood pressure

## 2020-01-31 NOTE — PROGRESS NOTES
Subjective:     CC:    Chief Complaint   Patient presents with   • Establish Care       HISTORY OF THE PRESENT ILLNESS: This pleasant 59 y.o. male is here to establish care. His/her prior PCP was Natalie Gregorio.  He reports that he just had an annual exam and it was noted that he had elevated cholesterol, LDL, and an elevated fasting glucose.  He is already on Lipitor 40 mg daily and tolerates this medication well.  He is taking fenugreek as he was told this should help with blood glucose levels and cholesterol.   fenugreek for sugar and cholesterol.   Takes linolaic acid and glucosamine chondroitan    Has been on bp meds. Has pulsatile tinnitus right ear. The left ear rings. Profound hearing loss left ear, right ear severe hearing loss. Has seen ent though ent has not evaluated the whoisabellang sound.     Allergies: Patient has no known allergies.    Current Outpatient Medications Ordered in Epic   Medication Sig Dispense Refill   • atorvastatin (LIPITOR) 40 MG Tab TAKE ONE TABLET BY MOUTH AT BEDTIME 90 Tab 3   • multivitamin (THERAGRAN) Tab Take 1 Tab by mouth every day.     • ascorbic acid (ASCORBIC ACID) 500 MG Tab Take 500 mg by mouth every day.     • calcium carbonate (CALCIUM CARBONATE) 500 MG Tab Take 500 mg by mouth 2 times a day, with meals.     • azelastine (OPTIVAR) 0.05 % ophthalmic solution Place 1 Drop in right eye 2 times a day. Use in affected eye(s) 1 Bottle 3   • cyclobenzaprine (FLEXERIL) 10 MG Tab Take 1 Tab by mouth 3 times a day as needed. (Patient not taking: Reported on 1/31/2020) 30 Tab 0     No current Epic-ordered facility-administered medications on file.        Past Medical History:   Diagnosis Date   • Arthritis     foot, shouder, neck   • BPH (benign prostatic hyperplasia) 2/3/2011   • Dental disorder    • Dyslipidemia 2/3/2011       Past Surgical History:   Procedure Laterality Date   • SHOULDER ARTHROSCOPY W/ ROTATOR CUFF REPAIR  7/21/2009    Performed by XENIA SHER at [facility]  "[facility] ORS   • SHOULDER DECOMPRESSION ARTHROSCOPIC  7/21/2009    Performed by XENIA SHER at SUR[facility]ERY [facility]   • CLAVICLE DISTAL EXCISION  7/21/2009    Performed by XENIA SHER at SURGERY [facility]   • EYE SURGERY      lasik   • KNEE ARTHROSCOPY      bilateral   • TONSILLECTOMY         Social History     Tobacco Use   • Smoking status: Never Smoker   • Smokeless tobacco: Current User     Types: Chew   Substance Use Topics   • Alcohol use: No     Comment: rare   • Drug use: No       Social History     Patient does not qualify to have social determinant information on file (likely too young).   Social History Narrative   • Not on file       Family History   Problem Relation Age of Onset   • Cancer Mother 50        breast cancer   • Psychiatric Illness Father 54        suicide   • Psychiatric Illness Brother 48        suicide       ROS:   Gen: no fevers/chills, no changes in weight  Eyes: no changes in vision  ENT: no sore throat, no hearing loss  Pulm: no sob, no cough  CV: no chest pain, no palpitations  GI: no nausea/vomiting, no diarrhea  : no dysuria  MSk: no myalgias  Skin: no rash  Neuro: no headaches, no numbness/tingling  Heme/Lymph: no easy bruising      Objective:     Exam: /80 (BP Location: Left arm, Patient Position: Sitting, BP Cuff Size: Adult)   Pulse 86   Temp 37.1 °C (98.7 °F) (Temporal)   Resp 14   Ht 1.854 m (6' 1\")   Wt 100.1 kg (220 lb 9.6 oz)   SpO2 96%  Body mass index is 29.1 kg/m².    General: Normal appearing. No distress.  HEENT: Normocephalic. Eyes conjunctiva clear lids without ptosis, eomi  Neck: Thyroid is not enlarged.  Pulmonary: Clear to ausculation.  Normal effort. No rales, ronchi, or wheezing.  Cardiovascular: Regular rate and rhythm without murmur. Carotid and radial pulses are intact and equal bilaterally.  Neurologic: No facial droop, normal gait, alert and oriented  Lymph: No cervical or supraclavicular lymph nodes are palpable  Skin: Warm and " dry.  No obvious lesions.  Musculoskeletal: No extremity cyanosis, clubbing, or edema.  Psych: Normal mood and affect. Judgment and insight is normal.      Assessment & Plan:   59 y.o. male with the following -    Problem List Items Addressed This Visit     Erectile dysfunction (Chronic)     denies         Dyslipidemia     I am not familiar with the possible of fenugreek side effects patient is aware this he takes this at his own risk  I recommend increasing physical activity and adjusting diet to maintain a BMI 25 or less to reduce cholesterol and fasting glucose levels as well as blood pressure           Other Visit Diagnoses     Pulsatile tinnitus        Relevant Orders    REFERRAL TO ENT        Problem   Erectile Dysfunction   Dyslipidemia     Return if symptoms worsen or fail to improve.    Please note that this dictation was created using voice recognition software. I have made every reasonable attempt to correct obvious errors, but I expect that there are errors of grammar and possibly content that I did not discover before finalizing the note.

## 2021-02-19 ENCOUNTER — TELEMEDICINE (OUTPATIENT)
Dept: MEDICAL GROUP | Facility: IMAGING CENTER | Age: 61
End: 2021-02-19
Payer: COMMERCIAL

## 2021-02-19 VITALS
HEART RATE: 78 BPM | WEIGHT: 210 LBS | BODY MASS INDEX: 27.83 KG/M2 | DIASTOLIC BLOOD PRESSURE: 78 MMHG | HEIGHT: 73 IN | OXYGEN SATURATION: 96 % | SYSTOLIC BLOOD PRESSURE: 118 MMHG

## 2021-02-19 DIAGNOSIS — Z12.5 SCREENING FOR PROSTATE CANCER: ICD-10-CM

## 2021-02-19 DIAGNOSIS — Z13.0 SCREENING FOR DEFICIENCY ANEMIA: ICD-10-CM

## 2021-02-19 DIAGNOSIS — M79.644 PAIN OF RIGHT THUMB: ICD-10-CM

## 2021-02-19 DIAGNOSIS — Z12.11 COLON CANCER SCREENING: ICD-10-CM

## 2021-02-19 DIAGNOSIS — Z13.1 SCREENING FOR DIABETES MELLITUS: ICD-10-CM

## 2021-02-19 DIAGNOSIS — E78.5 DYSLIPIDEMIA: ICD-10-CM

## 2021-02-19 PROCEDURE — 99396 PREV VISIT EST AGE 40-64: CPT | Mod: 95,CR | Performed by: FAMILY MEDICINE

## 2021-02-19 ASSESSMENT — PAIN SCALES - GENERAL: PAINLEVEL: 9=SEVERE PAIN

## 2021-02-19 ASSESSMENT — PATIENT HEALTH QUESTIONNAIRE - PHQ9: CLINICAL INTERPRETATION OF PHQ2 SCORE: 0

## 2021-02-19 NOTE — PROGRESS NOTES
Subjective:     Telemedicine Visit: Established Patient     This encounter was conducted via Zoom.   Verbal consent was obtained. Patient's identity was verified. Patient aware this will be   billed the same as an in person evaluation.  Patient in home, I am in office at [address] [place] [address]  CC:   Chief Complaint   Patient presents with   • Annual Exam     yearly check up     HPI:   Reggie Correia is a 60 y.o. male who presents for an annual exam. He is feeling well and has no complaints.  Right thumb inflamed. History of gout in his toe. Has had pain in the thumb since November 2020.   Just had annual derm apt 6 months ago.     Health Maintenance  AAA Screening: never smoker  Aspirin Use: The 10-year ASCVD risk score (Troy DC Jr., et al., 2013) is: 12.6% will start asa 81mg. ldl 169, hdl 33, tg 270 on statin 40mg.  Also on cholestol otc.   Diet: moderately healthy  Exercise: sedentary active  Substance Abuse: none  Seat belts, bike helmet, gun safety discussed.  Sun protection used.    Cancer screening  Colorectal Cancer Screening: due   Lung Cancer Screening: none smoker. Does dip decades.   Prostate Cancer Screening/PSA: psa today    Infectious disease screening/Immunizations  covid vaccine discussion      He  has a past medical history of Arthritis, BPH (benign prostatic hyperplasia) (2/3/2011), Dental disorder, and Dyslipidemia (2/3/2011). He also has no past medical history of Bronchitis, Heart burn, or Indigestion.  He  has a past surgical history that includes tonsillectomy; knee arthroscopy; shoulder arthroscopy w/ rotator cuff repair (7/21/2009); shoulder decompression arthroscopic (7/21/2009); clavicle distal excision (7/21/2009); and eye surgery.  Family History   Problem Relation Age of Onset   • Cancer Mother 50        breast cancer   • Psychiatric Illness Father 54        suicide   • Psychiatric Illness Brother 48        suicide     Social History     Tobacco Use   • Smoking  "status: Never Smoker   • Smokeless tobacco: Current User     Types: Chew   Substance Use Topics   • Alcohol use: No     Comment: rare   • Drug use: No       Patient Active Problem List    Diagnosis Date Noted   • Annual physical exam 01/15/2020   • Encounter for weight loss counseling 02/16/2018   • Erectile dysfunction 04/28/2017   • Noise-induced hearing loss of both ears 03/14/2017   • Dyslipidemia 02/03/2011   • Chronic back pain 02/03/2011       Current Outpatient Medications   Medication Sig Dispense Refill   • atorvastatin (LIPITOR) 40 MG Tab TAKE ONE TABLET BY MOUTH AT BEDTIME 90 Tab 3   • multivitamin (THERAGRAN) Tab Take 1 Tab by mouth every day.     • ascorbic acid (ASCORBIC ACID) 500 MG Tab Take 500 mg by mouth every day.     • azelastine (OPTIVAR) 0.05 % ophthalmic solution Place 1 Drop in right eye 2 times a day. Use in affected eye(s) 1 Bottle 3     No current facility-administered medications for this visit.    (including changes today)  Allergies: Patient has no known allergies.    Review of Systems   Constitutional: Negative for fever, chills and malaise/fatigue.   HENT: Negative for congestion.    Eyes: Negative for pain.   Respiratory: Negative for cough and shortness of breath.    Cardiovascular: Negative for leg swelling.   Gastrointestinal: Negative for nausea, vomiting, abdominal pain and diarrhea.   Genitourinary: Negative for dysuria and hematuria.   Skin: Negative for rash.   Neurological: Negative for dizziness, focal weakness and headaches.   Endo/Heme/Allergies: Does not bruise/bleed easily.   Psychiatric/Behavioral: Negative for depression.  The patient is not nervous/anxious.      Objective:     /78   Pulse 78   Ht 1.854 m (6' 1\")   Wt 95.3 kg (210 lb)   SpO2 96%   BMI 27.71 kg/m²   Body mass index is 27.71 kg/m².  Wt Readings from Last 4 Encounters:   02/19/21 95.3 kg (210 lb)   01/31/20 100 kg (220 lb 9.6 oz)   01/15/20 100 kg (221 lb)   02/16/18 96 kg (211 lb 10.3 oz) "       Physical Exam:  Physical Exam   Constitutional: He is well-developed, well-nourished, and in no distress. No distress.   HENT:   Head: Normocephalic and atraumatic.   Eyes: Pupils are equal, round, and reactive to light. Conjunctivae and EOM are normal. Right eye exhibits no discharge. Left eye exhibits no discharge. No scleral icterus.   Neck: No thyromegaly present.   Pulmonary/Chest: Effort normal. No respiratory distress.   Abdominal: He exhibits no distension.   Musculoskeletal:         General: No edema.   Neurological: He is alert.   Skin: Skin is warm and dry. He is not diaphoretic.   Psychiatric: Affect and judgment normal.         Assessment and Plan:     No problems updated.  Problem List Items Addressed This Visit     Dyslipidemia    Relevant Orders    Lipid Profile      Other Visit Diagnoses     Screening for deficiency anemia        Relevant Orders    CBC WITH DIFFERENTIAL    Screening for diabetes mellitus        Relevant Orders    Comp Metabolic Panel    HEMOGLOBIN A1C    Screening for prostate cancer        Relevant Orders    PSA TOTAL + %FREE    Colon cancer screening        Relevant Orders    REFERRAL TO GI FOR COLONOSCOPY    Pain of right thumb        Relevant Orders    DX-FINGER(S) 2+ RIGHT          Follow-up: as needed with concerns and annual for screenings  -Smoking cessation discussed if smoking and encouraged to come to office if quit and tempted or restarts smoking if pertinent to this patient. We discourage use of electronic smoking devises.   -Discussed healthy drinking habits if over 7 for females, 10 for males per week or more than 4 in one day. Women's guidelines likely to change soon as well  -Discussed healthy eating habits, exercise, being physically active, healthy bmi below 25  -patient to provide ages of family members when they were diagnosed with cancer , especially breast and ovarian, pancreatic cancers.  -Reviewed pap history in female patients, if they have a  gynecologist they are encouraged to follow up with that doctor for annual pelvic and breast exams. If they prefer to see me for women's health, I will perform pap smear with hpv dna testing, pelvic, and breast exam, these and male genital exams are always done in the presence of a medical assistant and with verbal permission from the patient.   -Colonoscopy history reviewed with those over 44yo or those with early family h/o colon cancer. If patient is due we provide them with various colon cancer screen modalities and relevant information to help the patient decide which is best for them.   -Mammograms recommended yearly for women over 41yo. Risks and benefits are reviewed and discussed with the patient and mammogram script provided.   -PSA discussed with males with family history of prostate cancer or those concerned. The decision to order this test is made with the patient.   -If patient prescribed medicines then told to review package insert for any warnings, side effects, contra-indications and medication vs medication reactions.   -STD testing added to lab work due to patients age per guideline recommendations  -Patients screened for anxiety and depression. If positive screening patients are offered behavioral health services, medications, and tools to improve mood.   There are no diagnoses linked to this encounter.  -to improve bone health take calcium and vitamin D, perform weight-bearing exercise, in addition we can discuss additional medications if needed including bisphosphonates, parathyroid hormone, and raloxifene.  Esophageal irritation can occur with bisphosphonate therapy this can be reduced by not laying down for 30 min after taking and taking with a full glass of water  -if wearing nail polish on toes or hands asked to rto if there are any dark brown or black areas under the nails  If lab tests ordered, then patient instructed to go to lab/location/plan  If imaging tests ordered, then patient  instructed to go to radiology/location/plan  If medicines ordered, then patient instructed to go to pharmacy/location/plan  Health maintenance I reviewed both men and women's health maintenance  Leading causes of death are motor vehicle accidents, cardiovascular disease, malignant tumors, and HIV.   Breast and ovarian cancer mutation screening was suggested if there was an increased risk for the patient based on adán scoring.   -A general visit to see the eye doctor every one to two years was thought appropriate. Dentist ever 6-12 months.  -Immunization suggestions: Tetanus shot every 10 years, Influenza immunization pneumococcal- anyone with chronic illnesses

## 2021-03-15 DIAGNOSIS — Z23 NEED FOR VACCINATION: ICD-10-CM

## 2021-04-01 ENCOUNTER — HOSPITAL ENCOUNTER (OUTPATIENT)
Dept: LAB | Facility: MEDICAL CENTER | Age: 61
End: 2021-04-01
Attending: FAMILY MEDICINE
Payer: COMMERCIAL

## 2021-04-01 ENCOUNTER — HOSPITAL ENCOUNTER (OUTPATIENT)
Dept: RADIOLOGY | Facility: MEDICAL CENTER | Age: 61
End: 2021-04-01
Attending: FAMILY MEDICINE
Payer: COMMERCIAL

## 2021-04-01 DIAGNOSIS — M79.644 PAIN OF RIGHT THUMB: ICD-10-CM

## 2021-04-01 DIAGNOSIS — Z12.5 SCREENING FOR PROSTATE CANCER: ICD-10-CM

## 2021-04-01 DIAGNOSIS — E78.5 DYSLIPIDEMIA: ICD-10-CM

## 2021-04-01 DIAGNOSIS — Z13.1 SCREENING FOR DIABETES MELLITUS: ICD-10-CM

## 2021-04-01 DIAGNOSIS — Z13.0 SCREENING FOR DEFICIENCY ANEMIA: ICD-10-CM

## 2021-04-01 LAB
ALBUMIN SERPL BCP-MCNC: 4.5 G/DL (ref 3.2–4.9)
ALBUMIN/GLOB SERPL: 2 G/DL
ALP SERPL-CCNC: 69 U/L (ref 30–99)
ALT SERPL-CCNC: 21 U/L (ref 2–50)
ANION GAP SERPL CALC-SCNC: 7 MMOL/L (ref 7–16)
AST SERPL-CCNC: 17 U/L (ref 12–45)
BASOPHILS # BLD AUTO: 0.7 % (ref 0–1.8)
BASOPHILS # BLD: 0.03 K/UL (ref 0–0.12)
BILIRUB SERPL-MCNC: 0.6 MG/DL (ref 0.1–1.5)
BUN SERPL-MCNC: 15 MG/DL (ref 8–22)
CALCIUM SERPL-MCNC: 10.2 MG/DL (ref 8.5–10.5)
CHLORIDE SERPL-SCNC: 108 MMOL/L (ref 96–112)
CHOLEST SERPL-MCNC: 145 MG/DL (ref 100–199)
CO2 SERPL-SCNC: 28 MMOL/L (ref 20–33)
CREAT SERPL-MCNC: 0.95 MG/DL (ref 0.5–1.4)
EOSINOPHIL # BLD AUTO: 0.09 K/UL (ref 0–0.51)
EOSINOPHIL NFR BLD: 2 % (ref 0–6.9)
ERYTHROCYTE [DISTWIDTH] IN BLOOD BY AUTOMATED COUNT: 47.8 FL (ref 35.9–50)
EST. AVERAGE GLUCOSE BLD GHB EST-MCNC: 140 MG/DL
GLOBULIN SER CALC-MCNC: 2.3 G/DL (ref 1.9–3.5)
GLUCOSE SERPL-MCNC: 106 MG/DL (ref 65–99)
HBA1C MFR BLD: 6.5 % (ref 4–5.6)
HCT VFR BLD AUTO: 49.6 % (ref 42–52)
HDLC SERPL-MCNC: 43 MG/DL
HGB BLD-MCNC: 15.6 G/DL (ref 14–18)
IMM GRANULOCYTES # BLD AUTO: 0.01 K/UL (ref 0–0.11)
IMM GRANULOCYTES NFR BLD AUTO: 0.2 % (ref 0–0.9)
LDLC SERPL CALC-MCNC: 80 MG/DL
LYMPHOCYTES # BLD AUTO: 1.6 K/UL (ref 1–4.8)
LYMPHOCYTES NFR BLD: 34.9 % (ref 22–41)
MCH RBC QN AUTO: 30.8 PG (ref 27–33)
MCHC RBC AUTO-ENTMCNC: 31.5 G/DL (ref 33.7–35.3)
MCV RBC AUTO: 97.8 FL (ref 81.4–97.8)
MONOCYTES # BLD AUTO: 0.33 K/UL (ref 0–0.85)
MONOCYTES NFR BLD AUTO: 7.2 % (ref 0–13.4)
NEUTROPHILS # BLD AUTO: 2.53 K/UL (ref 1.82–7.42)
NEUTROPHILS NFR BLD: 55 % (ref 44–72)
NRBC # BLD AUTO: 0 K/UL
NRBC BLD-RTO: 0 /100 WBC
PLATELET # BLD AUTO: 180 K/UL (ref 164–446)
PMV BLD AUTO: 11.1 FL (ref 9–12.9)
POTASSIUM SERPL-SCNC: 5.2 MMOL/L (ref 3.6–5.5)
PROT SERPL-MCNC: 6.8 G/DL (ref 6–8.2)
RBC # BLD AUTO: 5.07 M/UL (ref 4.7–6.1)
SODIUM SERPL-SCNC: 143 MMOL/L (ref 135–145)
TRIGL SERPL-MCNC: 110 MG/DL (ref 0–149)
WBC # BLD AUTO: 4.6 K/UL (ref 4.8–10.8)

## 2021-04-01 PROCEDURE — 84154 ASSAY OF PSA FREE: CPT

## 2021-04-01 PROCEDURE — 84153 ASSAY OF PSA TOTAL: CPT

## 2021-04-01 PROCEDURE — 83036 HEMOGLOBIN GLYCOSYLATED A1C: CPT

## 2021-04-01 PROCEDURE — 73140 X-RAY EXAM OF FINGER(S): CPT | Mod: RT

## 2021-04-01 PROCEDURE — 80053 COMPREHEN METABOLIC PANEL: CPT

## 2021-04-01 PROCEDURE — 80061 LIPID PANEL: CPT

## 2021-04-01 PROCEDURE — 85025 COMPLETE CBC W/AUTO DIFF WBC: CPT

## 2021-04-03 LAB
PSA FREE MFR SERPL: 25 %
PSA FREE SERPL-MCNC: 0.6 NG/ML
PSA SERPL-MCNC: 2.4 NG/ML (ref 0–4)

## 2021-04-27 DIAGNOSIS — Z00.00 ANNUAL PHYSICAL EXAM: ICD-10-CM

## 2021-04-27 DIAGNOSIS — E78.5 DYSLIPIDEMIA (HIGH LDL; LOW HDL): ICD-10-CM

## 2021-04-28 RX ORDER — ATORVASTATIN CALCIUM 40 MG/1
TABLET, FILM COATED ORAL
Qty: 90 TABLET | Refills: 3 | Status: SHIPPED | OUTPATIENT
Start: 2021-04-28 | End: 2023-12-13

## 2021-04-28 RX ORDER — ATORVASTATIN CALCIUM 40 MG/1
TABLET, FILM COATED ORAL
Qty: 30 TABLET | Refills: 0 | Status: SHIPPED | OUTPATIENT
Start: 2021-04-28 | End: 2021-04-28

## 2023-12-13 PROBLEM — M17.12 LEFT KNEE DJD: Status: ACTIVE | Noted: 2023-12-13

## 2023-12-13 PROBLEM — M17.11 OSTEOARTHRITIS OF RIGHT KNEE: Status: ACTIVE | Noted: 2023-12-13
